# Patient Record
Sex: MALE | Race: ASIAN | NOT HISPANIC OR LATINO
[De-identification: names, ages, dates, MRNs, and addresses within clinical notes are randomized per-mention and may not be internally consistent; named-entity substitution may affect disease eponyms.]

---

## 2020-12-23 PROBLEM — Z00.00 ENCOUNTER FOR PREVENTIVE HEALTH EXAMINATION: Status: ACTIVE | Noted: 2020-12-23

## 2020-12-24 ENCOUNTER — APPOINTMENT (OUTPATIENT)
Dept: ORTHOPEDIC SURGERY | Facility: CLINIC | Age: 40
End: 2020-12-24

## 2023-10-06 ENCOUNTER — OUTPATIENT (OUTPATIENT)
Dept: OUTPATIENT SERVICES | Facility: HOSPITAL | Age: 43
LOS: 1 days | End: 2023-10-06
Payer: MEDICAID

## 2023-10-06 ENCOUNTER — APPOINTMENT (OUTPATIENT)
Dept: RADIOLOGY | Facility: CLINIC | Age: 43
End: 2023-10-06
Payer: MEDICAID

## 2023-10-06 ENCOUNTER — TRANSCRIPTION ENCOUNTER (OUTPATIENT)
Age: 43
End: 2023-10-06

## 2023-10-06 DIAGNOSIS — R76.12 NONSPECIFIC REACTION TO CELL MEDIATED IMMUNITY MEASUREMENT OF GAMMA INTERFERON ANTIGEN RESPONSE WITHOUT ACTIVE TUBERCULOSIS: ICD-10-CM

## 2023-10-06 PROCEDURE — 71046 X-RAY EXAM CHEST 2 VIEWS: CPT | Mod: 26

## 2023-10-06 PROCEDURE — 71046 X-RAY EXAM CHEST 2 VIEWS: CPT

## 2023-10-10 ENCOUNTER — APPOINTMENT (OUTPATIENT)
Dept: PULMONOLOGY | Facility: CLINIC | Age: 43
End: 2023-10-10
Payer: MEDICAID

## 2023-10-10 VITALS
BODY MASS INDEX: 20.89 KG/M2 | SYSTOLIC BLOOD PRESSURE: 105 MMHG | WEIGHT: 130 LBS | OXYGEN SATURATION: 96 % | DIASTOLIC BLOOD PRESSURE: 71 MMHG | RESPIRATION RATE: 16 BRPM | HEART RATE: 83 BPM | HEIGHT: 66 IN

## 2023-10-10 PROCEDURE — ZZZZZ: CPT

## 2023-10-10 PROCEDURE — 94729 DIFFUSING CAPACITY: CPT

## 2023-10-10 PROCEDURE — 94010 BREATHING CAPACITY TEST: CPT

## 2023-10-10 PROCEDURE — 99244 OFF/OP CNSLTJ NEW/EST MOD 40: CPT | Mod: 25

## 2023-10-10 PROCEDURE — 94727 GAS DIL/WSHOT DETER LNG VOL: CPT

## 2023-10-12 ENCOUNTER — APPOINTMENT (OUTPATIENT)
Dept: CT IMAGING | Facility: CLINIC | Age: 43
End: 2023-10-12
Payer: MEDICAID

## 2023-10-12 ENCOUNTER — OUTPATIENT (OUTPATIENT)
Dept: OUTPATIENT SERVICES | Facility: HOSPITAL | Age: 43
LOS: 1 days | End: 2023-10-12
Payer: MEDICAID

## 2023-10-12 DIAGNOSIS — J84.10 PULMONARY FIBROSIS, UNSPECIFIED: ICD-10-CM

## 2023-10-12 PROCEDURE — 71250 CT THORAX DX C-: CPT

## 2023-10-12 PROCEDURE — 71250 CT THORAX DX C-: CPT | Mod: 26

## 2023-10-14 LAB — BACTERIA SPT CULT: ABNORMAL

## 2023-10-17 ENCOUNTER — APPOINTMENT (OUTPATIENT)
Dept: PULMONOLOGY | Facility: CLINIC | Age: 43
End: 2023-10-17
Payer: MEDICAID

## 2023-10-17 VITALS
SYSTOLIC BLOOD PRESSURE: 114 MMHG | HEIGHT: 68 IN | WEIGHT: 138 LBS | OXYGEN SATURATION: 97 % | HEART RATE: 77 BPM | DIASTOLIC BLOOD PRESSURE: 68 MMHG | BODY MASS INDEX: 20.92 KG/M2

## 2023-10-17 DIAGNOSIS — E80.4 GILBERT SYNDROME: ICD-10-CM

## 2023-10-17 PROCEDURE — 99213 OFFICE O/P EST LOW 20 MIN: CPT

## 2023-10-18 PROBLEM — E80.4 GILBERT'S SYNDROME: Status: ACTIVE | Noted: 2023-10-10

## 2023-11-22 ENCOUNTER — APPOINTMENT (OUTPATIENT)
Dept: PULMONOLOGY | Facility: CLINIC | Age: 43
End: 2023-11-22

## 2023-12-13 ENCOUNTER — APPOINTMENT (OUTPATIENT)
Dept: PULMONOLOGY | Facility: CLINIC | Age: 43
End: 2023-12-13
Payer: MEDICAID

## 2023-12-13 VITALS — HEART RATE: 73 BPM | DIASTOLIC BLOOD PRESSURE: 61 MMHG | SYSTOLIC BLOOD PRESSURE: 94 MMHG | OXYGEN SATURATION: 97 %

## 2023-12-13 DIAGNOSIS — Z86.11 PERSONAL HISTORY OF TUBERCULOSIS: ICD-10-CM

## 2023-12-13 DIAGNOSIS — J84.10 PULMONARY FIBROSIS, UNSPECIFIED: ICD-10-CM

## 2023-12-13 PROCEDURE — 99213 OFFICE O/P EST LOW 20 MIN: CPT

## 2023-12-13 NOTE — REASON FOR VISIT
[Follow-Up] : a follow-up visit [Abnormal CXR/ Chest CT] : an abnormal CXR/ chest CT [Latent TB/ +PPD/ +IGRA] : latent TB/ +PPD/ +IGRA

## 2023-12-15 PROBLEM — Z86.11 HISTORY OF TUBERCULOSIS: Status: ACTIVE | Noted: 2023-10-10

## 2023-12-15 PROBLEM — J84.10 CALCIFIED GRANULOMA OF LUNG: Status: ACTIVE | Noted: 2023-10-10

## 2023-12-15 NOTE — PROCEDURE
[FreeTextEntry1] :  Culture - Acid Fast - Sputum w/Smear             Final  No Documents Attached    	 Patient:     KESHAV CALLEJAS  MRN:         [id number]  :         1980  FIN:         [id number] SEX:         Male  Accession:   [id number]  Microbiology   Test Name:                Acid Fast Sputum  [P1]   Accession:                [id number] Source:                   .Sputum                  Body Site: Collected Date/Time:      10/10/2023 15:29 EDT     Received Date/Time:       10/10/2023 21:03 EDT Start Date/Time:          10/10/2023 21:03 EDT     Free Text Source:  ***FINAL REPORT*** Final Report  [] Reported Date/Time: 2023 15:04 EST No acid-fast bacilli isolated after 6 weeks.  ***STAIN REPORT*** Acid Fast Stain  [] Reported Date/Time: 10/11/2023 14:34 EDT  No acid-fast bacilli seen by fluorochrome stain   Performing Locations P1:   This test was performed at:       [facility] [address] [id number]  Ordered by: JACKIE GALAN       Collected/Examined: 2023 03:29PM       Verification Required       Stage: Final       Performed at: [facility] (Med Director: Danilo Bhardwaj)       Resulted: 2023 03:04PM       Last Updated: 2023 03:04PM       Accession: [id number]       To be verified by: JACKIE GALAN      __________   CT Chest No Cont             Final  No Documents Attached    	 EXAM: [id number] - CT CHEST  - ORDERED BY: JACKIE GALAN   PROCEDURE DATE:  10/12/2023    INTERPRETATION:  Clinical indication: Lung calcified granuloma.  Axial CT images of the chest are obtained without intravenous administration of contrast.  No prior chest CTs are available for comparison.  No enlarged axillary, mediastinal or hilar lymph nodes.  Heart size is normal. No pericardial or pleural effusions.  Evaluation of the upper abdomen demonstrate no significant abnormality.  Evaluation of the lungs demonstrate a few right upper lung calcified nodules sequela of prior granulomatous infection with the largest in the right upper lobe having the tubular branching appearance measuring about 9 mm in length as measured on the axial images and corresponding to the abnormalities seen on the chest x-ray of 2023. A few associated noncalcified branching tubular opacities within the right upper lobe largest measuring about 6 mm in length as measured on the axial images suggestive of foci of mucoid impacted distal airways and again likely sequela of prior granulomatous infection given the other findings.  5 mm linear opacity associated with the left fissure may represent a focus of fissural thickening.  No central endobronchial lesions.  Bones are unremarkable.  IMPRESSION: Right upper lung calcified granulomas and small tubular opacities suggestive of foci of mucoid impacted distal airways as described above which are likely sequela of prior granulomatous infection.  --- End of Report ---       ABRAHAM TOVAR MD; Attending Radiologist This document has been electronically signed. Oct 23 2023  3:50PM  Ordered by: JACKIE GALAN       Collected/Examined: 2023 08:30PM       Verified by: JACKIE GALAN 2023 01:28PM       Result Communication: No patient communication needed at this time; Stage: Final       Performed at: [facility] [place]       Resulted: [id number] 03:40PM       Last Updated: 2023 01:28PM       Accession: [id number]

## 2023-12-15 NOTE — ASSESSMENT
[FreeTextEntry1] : Mr. 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 There is no evidence of active tuberculosis at this point. Advised that he return for pulmonary follow-up on an as-needed basis.

## 2023-12-15 NOTE — HISTORY OF PRESENT ILLNESS
[Never] : never [TextBox_4] : KESHAV CALLEJAS is a 43 year old male, with history of tuberculosis, lung nodules, Gilbert's syndrome, who presents to the office for follow up evaluation. Patient reports that he is feeling well overall with no respiratory complaints.

## 2023-12-15 NOTE — ADDENDUM
INR due 5/12/22   [FreeTextEntry1] : I, Enedina Beaversgeneva, acted solely as a scribe for Dr. Shahrzad Guardado D.O., on this date 12/13/2023.   All medical record entries made by the Scribe were at my, Dr. Shahrzad Guardado D.O., direction and personally dictated by me on 12/13/2023. I have reviewed the chart and agree that the record accurately reflects my personal performance of the history, physical exam, assessment and plan. I have also personally directed, reviewed, and agreed with the chart.

## 2023-12-15 NOTE — DISCUSSION/SUMMARY
I reviewed the H&P, I examined the patient, and there are no changes in the patient's condition.     [FreeTextEntry1] : Mr. KESHAV CALLEJAS is an 43 year old male, history of tuberculosis in 2008, Gilbert's syndrome, lung nodules.  Positive QuantiFERON test.  Patient appears stable from a pulmonary perspective.

## 2024-02-05 LAB — ACID FAST STN SPT: NORMAL
